# Patient Record
Sex: MALE | Race: WHITE | Employment: FULL TIME | URBAN - METROPOLITAN AREA
[De-identification: names, ages, dates, MRNs, and addresses within clinical notes are randomized per-mention and may not be internally consistent; named-entity substitution may affect disease eponyms.]

---

## 2017-12-20 ENCOUNTER — HOSPITAL ENCOUNTER (OUTPATIENT)
Dept: MRI IMAGING | Age: 52
Discharge: HOME | End: 2017-12-20
Attending: INTERNAL MEDICINE
Payer: OTHER GOVERNMENT

## 2017-12-20 DIAGNOSIS — R51.9 HEADACHE: ICD-10-CM

## 2017-12-20 PROCEDURE — 70551 MRI BRAIN STEM W/O DYE: CPT

## 2018-08-11 ENCOUNTER — OP HISTORICAL/CONVERTED ENCOUNTER (OUTPATIENT)
Dept: OTHER | Age: 53
End: 2018-08-11

## 2018-11-06 ENCOUNTER — HOSPITAL ENCOUNTER (OUTPATIENT)
Dept: LAB | Age: 53
Discharge: HOME | End: 2018-11-06

## 2023-10-11 ENCOUNTER — HOSPITAL ENCOUNTER (OUTPATIENT)
Facility: HOSPITAL | Age: 58
Setting detail: OBSERVATION
Discharge: HOME | End: 2023-10-12
Attending: EMERGENCY MEDICINE | Admitting: INTERNAL MEDICINE
Payer: OTHER GOVERNMENT

## 2023-10-11 ENCOUNTER — APPOINTMENT (OUTPATIENT)
Facility: HOSPITAL | Age: 58
End: 2023-10-11
Payer: OTHER GOVERNMENT

## 2023-10-11 DIAGNOSIS — R06.02 SHORTNESS OF BREATH: ICD-10-CM

## 2023-10-11 DIAGNOSIS — R42 DIZZINESS: ICD-10-CM

## 2023-10-11 DIAGNOSIS — R07.9 CHEST PAIN, UNSPECIFIED TYPE: Primary | ICD-10-CM

## 2023-10-11 LAB
ALBUMIN SERPL-MCNC: 4.1 G/DL (ref 3.5–5)
ALBUMIN/GLOB SERPL: 1.1 (ref 1.1–2.2)
ALP SERPL-CCNC: 52 U/L (ref 45–117)
ALT SERPL-CCNC: 40 U/L (ref 12–78)
ANION GAP SERPL CALC-SCNC: 4 MMOL/L (ref 5–15)
APPEARANCE UR: CLEAR
AST SERPL-CCNC: 21 U/L (ref 15–37)
BACTERIA URNS QL MICRO: NEGATIVE /HPF
BASOPHILS # BLD: 0 K/UL (ref 0–0.1)
BASOPHILS NFR BLD: 1 % (ref 0–1)
BILIRUB SERPL-MCNC: 0.8 MG/DL (ref 0.2–1)
BILIRUB UR QL: NEGATIVE
BUN SERPL-MCNC: 14 MG/DL (ref 6–20)
BUN/CREAT SERPL: 11 (ref 12–20)
CALCIUM SERPL-MCNC: 9.4 MG/DL (ref 8.5–10.1)
CHLORIDE SERPL-SCNC: 108 MMOL/L (ref 97–108)
CO2 SERPL-SCNC: 28 MMOL/L (ref 21–32)
COLOR UR: ABNORMAL
COMMENT:: NORMAL
CREAT SERPL-MCNC: 1.25 MG/DL (ref 0.7–1.3)
D DIMER PPP FEU-MCNC: 0.19 MG/L FEU (ref 0–0.65)
DIFFERENTIAL METHOD BLD: NORMAL
ECHO BSA: 2.14 M2
EKG ATRIAL RATE: 58 BPM
EKG DIAGNOSIS: NORMAL
EKG P AXIS: 68 DEGREES
EKG P-R INTERVAL: 194 MS
EKG Q-T INTERVAL: 440 MS
EKG QRS DURATION: 76 MS
EKG QTC CALCULATION (BAZETT): 431 MS
EKG R AXIS: 78 DEGREES
EKG T AXIS: 90 DEGREES
EKG VENTRICULAR RATE: 58 BPM
EOSINOPHIL # BLD: 0.2 K/UL (ref 0–0.4)
EOSINOPHIL NFR BLD: 3 % (ref 0–7)
EPITH CASTS URNS QL MICRO: ABNORMAL /LPF
ERYTHROCYTE [DISTWIDTH] IN BLOOD BY AUTOMATED COUNT: 12 % (ref 11.5–14.5)
EST. AVERAGE GLUCOSE BLD GHB EST-MCNC: 103 MG/DL
GLOBULIN SER CALC-MCNC: 3.8 G/DL (ref 2–4)
GLUCOSE BLD STRIP.AUTO-MCNC: 109 MG/DL (ref 65–117)
GLUCOSE SERPL-MCNC: 96 MG/DL (ref 65–100)
GLUCOSE UR STRIP.AUTO-MCNC: NEGATIVE MG/DL
HBA1C MFR BLD: 5.2 % (ref 4–5.6)
HCT VFR BLD AUTO: 47.3 % (ref 36.6–50.3)
HGB BLD-MCNC: 16 G/DL (ref 12.1–17)
HGB UR QL STRIP: NEGATIVE
HYALINE CASTS URNS QL MICRO: ABNORMAL /LPF (ref 0–2)
IMM GRANULOCYTES # BLD AUTO: 0 K/UL (ref 0–0.04)
IMM GRANULOCYTES NFR BLD AUTO: 0 % (ref 0–0.5)
KETONES UR QL STRIP.AUTO: NEGATIVE MG/DL
LEUKOCYTE ESTERASE UR QL STRIP.AUTO: NEGATIVE
LYMPHOCYTES # BLD: 1 K/UL (ref 0.8–3.5)
LYMPHOCYTES NFR BLD: 20 % (ref 12–49)
MAGNESIUM SERPL-MCNC: 2.1 MG/DL (ref 1.6–2.4)
MCH RBC QN AUTO: 30.1 PG (ref 26–34)
MCHC RBC AUTO-ENTMCNC: 33.8 G/DL (ref 30–36.5)
MCV RBC AUTO: 88.9 FL (ref 80–99)
MONOCYTES # BLD: 0.5 K/UL (ref 0–1)
MONOCYTES NFR BLD: 9 % (ref 5–13)
NEUTS SEG # BLD: 3.4 K/UL (ref 1.8–8)
NEUTS SEG NFR BLD: 67 % (ref 32–75)
NITRITE UR QL STRIP.AUTO: NEGATIVE
NRBC # BLD: 0 K/UL (ref 0–0.01)
NRBC BLD-RTO: 0 PER 100 WBC
NT PRO BNP: 99 PG/ML
PH UR STRIP: 8 (ref 5–8)
PLATELET # BLD AUTO: 171 K/UL (ref 150–400)
PMV BLD AUTO: 10.9 FL (ref 8.9–12.9)
POTASSIUM SERPL-SCNC: 3.9 MMOL/L (ref 3.5–5.1)
PROT SERPL-MCNC: 7.9 G/DL (ref 6.4–8.2)
PROT UR STRIP-MCNC: NEGATIVE MG/DL
RBC # BLD AUTO: 5.32 M/UL (ref 4.1–5.7)
RBC #/AREA URNS HPF: ABNORMAL /HPF (ref 0–5)
SERVICE CMNT-IMP: NORMAL
SODIUM SERPL-SCNC: 140 MMOL/L (ref 136–145)
SP GR UR REFRACTOMETRY: >1.03 (ref 1–1.03)
SPECIMEN HOLD: NORMAL
SPECIMEN HOLD: NORMAL
STRESS ANGINA INDEX: 0
STRESS BASELINE DIAS BP: 88 MMHG
STRESS BASELINE HR: 57 BPM
STRESS BASELINE ST DEPRESSION: 0 MM
STRESS BASELINE SYS BP: 146 MMHG
STRESS ESTIMATED WORKLOAD: 10 METS
STRESS EXERCISE DUR MIN: 13 MIN
STRESS EXERCISE DUR SEC: 0 SEC
STRESS PEAK DIAS BP: 100 MMHG
STRESS PEAK SYS BP: 170 MMHG
STRESS PERCENT HR ACHIEVED: 94 %
STRESS POST PEAK HR: 153 BPM
STRESS RATE PRESSURE PRODUCT: NORMAL BPM*MMHG
STRESS TARGET HR: 163 BPM
T4 FREE SERPL-MCNC: 1.1 NG/DL (ref 0.8–1.5)
TROPONIN I SERPL HS-MCNC: 4 NG/L (ref 0–76)
TROPONIN I SERPL HS-MCNC: 4 NG/L (ref 0–76)
TSH SERPL DL<=0.05 MIU/L-ACNC: 2.67 UIU/ML (ref 0.36–3.74)
UROBILINOGEN UR QL STRIP.AUTO: 0.2 EU/DL (ref 0.2–1)
WBC # BLD AUTO: 5.1 K/UL (ref 4.1–11.1)
WBC URNS QL MICRO: ABNORMAL /HPF (ref 0–4)

## 2023-10-11 PROCEDURE — 82962 GLUCOSE BLOOD TEST: CPT

## 2023-10-11 PROCEDURE — 93325 DOPPLER ECHO COLOR FLOW MAPG: CPT | Performed by: STUDENT IN AN ORGANIZED HEALTH CARE EDUCATION/TRAINING PROGRAM

## 2023-10-11 PROCEDURE — 99285 EMERGENCY DEPT VISIT HI MDM: CPT

## 2023-10-11 PROCEDURE — 84484 ASSAY OF TROPONIN QUANT: CPT

## 2023-10-11 PROCEDURE — 83735 ASSAY OF MAGNESIUM: CPT

## 2023-10-11 PROCEDURE — 93018 CV STRESS TEST I&R ONLY: CPT | Performed by: STUDENT IN AN ORGANIZED HEALTH CARE EDUCATION/TRAINING PROGRAM

## 2023-10-11 PROCEDURE — 71275 CT ANGIOGRAPHY CHEST: CPT

## 2023-10-11 PROCEDURE — 36415 COLL VENOUS BLD VENIPUNCTURE: CPT

## 2023-10-11 PROCEDURE — 85025 COMPLETE CBC W/AUTO DIFF WBC: CPT

## 2023-10-11 PROCEDURE — 83880 ASSAY OF NATRIURETIC PEPTIDE: CPT

## 2023-10-11 PROCEDURE — 80053 COMPREHEN METABOLIC PANEL: CPT

## 2023-10-11 PROCEDURE — 6360000004 HC RX CONTRAST MEDICATION: Performed by: NURSE PRACTITIONER

## 2023-10-11 PROCEDURE — 93016 CV STRESS TEST SUPVJ ONLY: CPT | Performed by: STUDENT IN AN ORGANIZED HEALTH CARE EDUCATION/TRAINING PROGRAM

## 2023-10-11 PROCEDURE — 84443 ASSAY THYROID STIM HORMONE: CPT

## 2023-10-11 PROCEDURE — G0378 HOSPITAL OBSERVATION PER HR: HCPCS

## 2023-10-11 PROCEDURE — 6360000002 HC RX W HCPCS: Performed by: NURSE PRACTITIONER

## 2023-10-11 PROCEDURE — 83036 HEMOGLOBIN GLYCOSYLATED A1C: CPT

## 2023-10-11 PROCEDURE — 94761 N-INVAS EAR/PLS OXIMETRY MLT: CPT

## 2023-10-11 PROCEDURE — 93320 DOPPLER ECHO COMPLETE: CPT | Performed by: STUDENT IN AN ORGANIZED HEALTH CARE EDUCATION/TRAINING PROGRAM

## 2023-10-11 PROCEDURE — 93350 STRESS TTE ONLY: CPT | Performed by: STUDENT IN AN ORGANIZED HEALTH CARE EDUCATION/TRAINING PROGRAM

## 2023-10-11 PROCEDURE — 81001 URINALYSIS AUTO W/SCOPE: CPT

## 2023-10-11 PROCEDURE — 84439 ASSAY OF FREE THYROXINE: CPT

## 2023-10-11 PROCEDURE — 93005 ELECTROCARDIOGRAM TRACING: CPT | Performed by: EMERGENCY MEDICINE

## 2023-10-11 PROCEDURE — 70450 CT HEAD/BRAIN W/O DYE: CPT

## 2023-10-11 PROCEDURE — 93017 CV STRESS TEST TRACING ONLY: CPT

## 2023-10-11 PROCEDURE — 93010 ELECTROCARDIOGRAM REPORT: CPT | Performed by: INTERNAL MEDICINE

## 2023-10-11 PROCEDURE — 85379 FIBRIN DEGRADATION QUANT: CPT

## 2023-10-11 RX ORDER — ONDANSETRON 2 MG/ML
4 INJECTION INTRAMUSCULAR; INTRAVENOUS EVERY 6 HOURS PRN
Status: DISCONTINUED | OUTPATIENT
Start: 2023-10-11 | End: 2023-10-12 | Stop reason: HOSPADM

## 2023-10-11 RX ORDER — SODIUM CHLORIDE 9 MG/ML
INJECTION, SOLUTION INTRAVENOUS PRN
Status: DISCONTINUED | OUTPATIENT
Start: 2023-10-11 | End: 2023-10-12 | Stop reason: HOSPADM

## 2023-10-11 RX ORDER — ROSUVASTATIN CALCIUM 20 MG/1
TABLET, FILM COATED ORAL
COMMUNITY
Start: 2023-09-26

## 2023-10-11 RX ORDER — CYCLOBENZAPRINE HCL 10 MG
TABLET ORAL
COMMUNITY
Start: 2023-05-12

## 2023-10-11 RX ORDER — ENOXAPARIN SODIUM 100 MG/ML
40 INJECTION SUBCUTANEOUS DAILY
Status: DISCONTINUED | OUTPATIENT
Start: 2023-10-11 | End: 2023-10-12 | Stop reason: HOSPADM

## 2023-10-11 RX ORDER — TRAZODONE HYDROCHLORIDE 100 MG/1
100 TABLET ORAL
COMMUNITY
Start: 2023-05-12 | End: 2023-10-12

## 2023-10-11 RX ORDER — SODIUM CHLORIDE 0.9 % (FLUSH) 0.9 %
5-40 SYRINGE (ML) INJECTION EVERY 12 HOURS SCHEDULED
Status: DISCONTINUED | OUTPATIENT
Start: 2023-10-11 | End: 2023-10-12 | Stop reason: HOSPADM

## 2023-10-11 RX ORDER — LEVOTHYROXINE SODIUM 0.05 MG/1
50 TABLET ORAL
Status: DISCONTINUED | OUTPATIENT
Start: 2023-10-12 | End: 2023-10-12 | Stop reason: HOSPADM

## 2023-10-11 RX ORDER — ONDANSETRON 4 MG/1
4 TABLET, ORALLY DISINTEGRATING ORAL EVERY 8 HOURS PRN
Status: DISCONTINUED | OUTPATIENT
Start: 2023-10-11 | End: 2023-10-12 | Stop reason: HOSPADM

## 2023-10-11 RX ORDER — NITROGLYCERIN 0.4 MG/1
0.4 TABLET SUBLINGUAL EVERY 5 MIN PRN
Status: DISCONTINUED | OUTPATIENT
Start: 2023-10-11 | End: 2023-10-12 | Stop reason: HOSPADM

## 2023-10-11 RX ORDER — ROSUVASTATIN CALCIUM 10 MG/1
20 TABLET, COATED ORAL NIGHTLY
Status: DISCONTINUED | OUTPATIENT
Start: 2023-10-11 | End: 2023-10-12 | Stop reason: HOSPADM

## 2023-10-11 RX ORDER — TRAZODONE HYDROCHLORIDE 50 MG/1
100 TABLET ORAL NIGHTLY PRN
Status: DISCONTINUED | OUTPATIENT
Start: 2023-10-11 | End: 2023-10-12

## 2023-10-11 RX ORDER — ACETAMINOPHEN 650 MG/1
650 SUPPOSITORY RECTAL EVERY 6 HOURS PRN
Status: DISCONTINUED | OUTPATIENT
Start: 2023-10-11 | End: 2023-10-12 | Stop reason: HOSPADM

## 2023-10-11 RX ORDER — MORPHINE SULFATE 2 MG/ML
2 INJECTION, SOLUTION INTRAMUSCULAR; INTRAVENOUS EVERY 4 HOURS PRN
Status: DISCONTINUED | OUTPATIENT
Start: 2023-10-11 | End: 2023-10-12 | Stop reason: HOSPADM

## 2023-10-11 RX ORDER — LEVOTHYROXINE SODIUM 0.05 MG/1
TABLET ORAL
COMMUNITY
Start: 2022-07-20

## 2023-10-11 RX ORDER — ACETAMINOPHEN 325 MG/1
650 TABLET ORAL EVERY 6 HOURS PRN
Status: DISCONTINUED | OUTPATIENT
Start: 2023-10-11 | End: 2023-10-12 | Stop reason: HOSPADM

## 2023-10-11 RX ORDER — POLYETHYLENE GLYCOL 3350 17 G/17G
17 POWDER, FOR SOLUTION ORAL DAILY PRN
Status: DISCONTINUED | OUTPATIENT
Start: 2023-10-11 | End: 2023-10-12 | Stop reason: HOSPADM

## 2023-10-11 RX ORDER — SODIUM CHLORIDE 0.9 % (FLUSH) 0.9 %
5-40 SYRINGE (ML) INJECTION PRN
Status: DISCONTINUED | OUTPATIENT
Start: 2023-10-11 | End: 2023-10-12 | Stop reason: HOSPADM

## 2023-10-11 RX ORDER — ALBUTEROL SULFATE 1.25 MG/3ML
1.25 SOLUTION RESPIRATORY (INHALATION) EVERY 6 HOURS PRN
Status: DISCONTINUED | OUTPATIENT
Start: 2023-10-11 | End: 2023-10-12 | Stop reason: HOSPADM

## 2023-10-11 RX ADMIN — IOPAMIDOL 100 ML: 755 INJECTION, SOLUTION INTRAVENOUS at 10:05

## 2023-10-11 RX ADMIN — ALBUTEROL SULFATE 1.25 MG: 1.25 SOLUTION RESPIRATORY (INHALATION) at 22:02

## 2023-10-11 ASSESSMENT — ENCOUNTER SYMPTOMS
EYE REDNESS: 0
ABDOMINAL DISTENTION: 0
SINUS PRESSURE: 0
VOMITING: 0
EYE PAIN: 0
NAUSEA: 0
SINUS PAIN: 0
SHORTNESS OF BREATH: 1
ABDOMINAL PAIN: 0
COLOR CHANGE: 0
TROUBLE SWALLOWING: 0
COUGH: 0

## 2023-10-11 ASSESSMENT — PAIN DESCRIPTION - ONSET: ONSET: AWAKENED FROM SLEEP

## 2023-10-11 ASSESSMENT — PAIN SCALES - GENERAL
PAINLEVEL_OUTOF10: 7
PAINLEVEL_OUTOF10: 0

## 2023-10-11 ASSESSMENT — PAIN - FUNCTIONAL ASSESSMENT
PAIN_FUNCTIONAL_ASSESSMENT: 0-10
PAIN_FUNCTIONAL_ASSESSMENT: NONE - DENIES PAIN

## 2023-10-11 ASSESSMENT — PAIN DESCRIPTION - LOCATION: LOCATION: CHEST

## 2023-10-11 ASSESSMENT — HEART SCORE: ECG: 1

## 2023-10-11 ASSESSMENT — PAIN DESCRIPTION - DESCRIPTORS: DESCRIPTORS: HEAVINESS

## 2023-10-11 NOTE — ED NOTES
Pt in [address] [place] [address] on calling for triage     Susan Thurman RN  10/11/23 2556

## 2023-10-11 NOTE — H&P
History and Physical    Date of Service:  10/11/2023  Primary Care Provider: Jacqueline Boles MD  Source of information: Patient, Family, External Medical Records    Chief Complaint: Shortness of Breath and Chest Pressure      History of Presenting Illness:   Missael Espino is a 62 y.o. male with a past medical history of hypothyroidism, HLD, who presents to the emergency room due to chest pain. Patient states he has been having chest pain for approximately 3 days. It is worse with laying flat, and when he takes a deep breath he feels like he is \"not getting oxygen. \"  He states his symptoms have worsened progressively over 3 days. He has had no cough, fevers, chills, congestion, sore throat, palpitations, or other complaints. In the ER his troponins were negative, EKG was sinus rhythm, and cardiology was consulted. Internal medicine was consulted for admission. The patient denies any headache, blurry vision, sore throat, trouble swallowing, trouble with speech, cough, fever, chills, N/V/D, abd pain, urinary symptoms, constipation, recent travels, sick contacts, focal or generalized neurological symptoms, falls, injuries, rashes, contact with COVID-19 diagnosed patients, hematemesis, melena, hemoptysis, hematuria, rashes, denies starting any new medications and denies any other concerns or problems besides as mentioned above. REVIEW OF SYSTEMS:  A comprehensive review of systems was negative except for that written in the History of Present Illness. No past medical history on file. Past Surgical History:   Procedure Laterality Date    XR MIDLINE EQUAL OR GREATER THAN 5 YEARS  1/21/2019    XR MIDLINE EQUAL OR GREATER THAN 5 YEARS 1/21/2019     Prior to Admission medications    Medication Sig Start Date End Date Taking?  Authorizing Provider   levothyroxine (SYNTHROID) 50 MCG tablet  7/20/22  Yes Provider, MD Guicho   cyclobenzaprine (FLEXERIL) 10 MG tablet Cyclobenzaprine Oral Emergency Contact: [facility] [address] Phone: [phone]  Mobile Phone: [phone]  Relation: Spouse     CRITICAL CARE WAS PERFORMED FOR THIS ENCOUNTER: 0 minutes performed in critical care, excluding procedures      Signed By: Opal Bello MD     October 11, 2023         Please note that this dictation may have been completed with Christen Medina, the computer voice recognition software. Quite often unanticipated grammatical, syntax, homophones, and other interpretive errors are inadvertently transcribed by the computer software. Please disregard these errors. Please excuse any errors that have escaped final proofreading.

## 2023-10-11 NOTE — ED TRIAGE NOTES
Pt arrives ambulatory via POV w/ c/c of SOB & chest pressure beginning 3 days ago. Pt reports recent hx of dizziness/vertigo 6 weeks ago for which he had an echocardiogram done @ the [facility] which was normal.     Pt denies any PMH other than XOL & hypothyroidism.

## 2023-10-11 NOTE — ED PROVIDER NOTES
[facility] EMERGENCY DEPT  EMERGENCY DEPARTMENT ENCOUNTER      Pt Name: Paula Rodas  MRN: [de-identified]  [id number] [facility] [place] R 1965  Date of evaluation: 10/11/2023  Provider: JODI Vickers NP      HISTORY OF PRESENT ILLNESS      This is a 59-year-old male who presents ambulatory to the emergency room with complaints of 3 days worth of chest pain described as pressure. Patient states approximately 6 weeks ago he developed dizziness and what he thought to be vertigo. At that time he had an echocardiogram done at the [place] which was normal.  Comes to the emergency room today with complaints of chest pressure and the feeling as though \"he cannot breathe. \"  Patient denies any worsening dyspnea on exertion, nausea or vomiting, fevers or chills. No diaphoresis. Does have a history of hypercholesterol and hypothyroidism. No early heart disease noted. No prior cardiac disease. No further complaints at this time. The history is provided by the patient. Nursing Notes were reviewed. REVIEW OF SYSTEMS         Review of Systems   Constitutional:  Negative for activity change, chills, diaphoresis, fatigue and fever. HENT:  Negative for congestion, sinus pressure, sinus pain and trouble swallowing. Eyes:  Negative for pain, redness and visual disturbance. Respiratory:  Positive for shortness of breath. Negative for cough. Cardiovascular:  Positive for chest pain. Negative for palpitations. Gastrointestinal:  Negative for abdominal distention, abdominal pain, nausea and vomiting. Genitourinary:  Negative for difficulty urinating, frequency and urgency. Musculoskeletal:  Negative for arthralgias, gait problem, neck pain and neck stiffness. Skin:  Negative for color change, pallor, rash and wound. Neurological:  Positive for dizziness. Negative for speech difficulty and weakness. Hematological:  Does not bruise/bleed easily. Psychiatric/Behavioral:  Negative for agitation.  The patient

## 2023-10-12 VITALS
HEIGHT: 73 IN | BODY MASS INDEX: 25.98 KG/M2 | DIASTOLIC BLOOD PRESSURE: 91 MMHG | SYSTOLIC BLOOD PRESSURE: 120 MMHG | HEART RATE: 71 BPM | OXYGEN SATURATION: 97 % | WEIGHT: 196 LBS | RESPIRATION RATE: 14 BRPM | TEMPERATURE: 97.7 F

## 2023-10-12 LAB
AMPHET UR QL SCN: NEGATIVE
ANION GAP SERPL CALC-SCNC: 5 MMOL/L (ref 5–15)
BARBITURATES UR QL SCN: NEGATIVE
BASOPHILS # BLD: 0 K/UL (ref 0–0.1)
BASOPHILS NFR BLD: 1 % (ref 0–1)
BENZODIAZ UR QL: NEGATIVE
BUN SERPL-MCNC: 12 MG/DL (ref 6–20)
BUN/CREAT SERPL: 10 (ref 12–20)
CALCIUM SERPL-MCNC: 8.4 MG/DL (ref 8.5–10.1)
CANNABINOIDS UR QL SCN: NEGATIVE
CHLORIDE SERPL-SCNC: 109 MMOL/L (ref 97–108)
CHOLEST SERPL-MCNC: 175 MG/DL
CO2 SERPL-SCNC: 27 MMOL/L (ref 21–32)
COCAINE UR QL SCN: NEGATIVE
CREAT SERPL-MCNC: 1.15 MG/DL (ref 0.7–1.3)
DIFFERENTIAL METHOD BLD: ABNORMAL
EOSINOPHIL # BLD: 0.1 K/UL (ref 0–0.4)
EOSINOPHIL NFR BLD: 2 % (ref 0–7)
ERYTHROCYTE [DISTWIDTH] IN BLOOD BY AUTOMATED COUNT: 12 % (ref 11.5–14.5)
GLUCOSE SERPL-MCNC: 111 MG/DL (ref 65–100)
HCT VFR BLD AUTO: 42.7 % (ref 36.6–50.3)
HDLC SERPL-MCNC: 53 MG/DL
HDLC SERPL: 3.3 (ref 0–5)
HGB BLD-MCNC: 14.9 G/DL (ref 12.1–17)
IMM GRANULOCYTES # BLD AUTO: 0 K/UL (ref 0–0.04)
IMM GRANULOCYTES NFR BLD AUTO: 0 % (ref 0–0.5)
LDLC SERPL CALC-MCNC: 103 MG/DL (ref 0–100)
LYMPHOCYTES # BLD: 1 K/UL (ref 0.8–3.5)
LYMPHOCYTES NFR BLD: 14 % (ref 12–49)
Lab: NORMAL
MCH RBC QN AUTO: 31 PG (ref 26–34)
MCHC RBC AUTO-ENTMCNC: 34.9 G/DL (ref 30–36.5)
MCV RBC AUTO: 89 FL (ref 80–99)
METHADONE UR QL: NEGATIVE
MONOCYTES # BLD: 0.6 K/UL (ref 0–1)
MONOCYTES NFR BLD: 8 % (ref 5–13)
NEUTS SEG # BLD: 5.2 K/UL (ref 1.8–8)
NEUTS SEG NFR BLD: 75 % (ref 32–75)
NRBC # BLD: 0 K/UL (ref 0–0.01)
NRBC BLD-RTO: 0 PER 100 WBC
OPIATES UR QL: NEGATIVE
PCP UR QL: NEGATIVE
PHOSPHATE SERPL-MCNC: 3.9 MG/DL (ref 2.6–4.7)
PLATELET # BLD AUTO: 149 K/UL (ref 150–400)
PMV BLD AUTO: 11 FL (ref 8.9–12.9)
POTASSIUM SERPL-SCNC: 3.6 MMOL/L (ref 3.5–5.1)
RBC # BLD AUTO: 4.8 M/UL (ref 4.1–5.7)
SODIUM SERPL-SCNC: 141 MMOL/L (ref 136–145)
TRIGL SERPL-MCNC: 95 MG/DL
VLDLC SERPL CALC-MCNC: 19 MG/DL
WBC # BLD AUTO: 7 K/UL (ref 4.1–11.1)

## 2023-10-12 PROCEDURE — 85025 COMPLETE CBC W/AUTO DIFF WBC: CPT

## 2023-10-12 PROCEDURE — 80048 BASIC METABOLIC PNL TOTAL CA: CPT

## 2023-10-12 PROCEDURE — 84100 ASSAY OF PHOSPHORUS: CPT

## 2023-10-12 PROCEDURE — 36415 COLL VENOUS BLD VENIPUNCTURE: CPT

## 2023-10-12 PROCEDURE — G0378 HOSPITAL OBSERVATION PER HR: HCPCS

## 2023-10-12 PROCEDURE — 80307 DRUG TEST PRSMV CHEM ANLYZR: CPT

## 2023-10-12 PROCEDURE — 94761 N-INVAS EAR/PLS OXIMETRY MLT: CPT

## 2023-10-12 PROCEDURE — 80061 LIPID PANEL: CPT

## 2023-10-12 RX ORDER — IBUPROFEN 600 MG/1
600 TABLET ORAL EVERY 8 HOURS
Qty: 20 TABLET | Refills: 0
Start: 2023-10-12

## 2023-10-12 NOTE — ED NOTES
Patient is frustrated being NPO still and has not received results from stress echo that was completed early afternoon yesterday. Patient still c/o chest pressure rated 6/10 and orthopnea.  [employer] message sent to cards office and att Dr. Gian Colbert, [place], [place]  10/12/23 7413

## 2023-10-12 NOTE — DISCHARGE SUMMARY
Hospitalist Discharge Summary     Patient ID:    Jacob Barajas  [de-identified]  62 y.o.  1965    Admit date of service: 10/11/2023    Discharge date of service: 10/12/2023    Admission Diagnoses: Chest pain [R07.9]    Chronic Diagnoses:      Discharge Medications:   Current Discharge Medication List        START taking these medications    Details   ibuprofen (ADVIL;MOTRIN) 600 MG tablet Take 1 tablet by mouth every 8 (eight) hours  Qty: 20 tablet, Refills: 0           CONTINUE these medications which have NOT CHANGED    Details   levothyroxine (SYNTHROID) 50 MCG tablet       cyclobenzaprine (FLEXERIL) 10 MG tablet Cyclobenzaprine Oral  po 1.0  prn    active      CRESTOR 20 MG tablet            STOP taking these medications       traZODone (DESYREL) 100 MG tablet Comments:   Reason for Stopping: Follow up Care:    1. No follow-up provider specified. in 1-2 weeks  2. Diet:  regular diet    Disposition:  Home. Advanced Directive:    Discharge Exam:  See today's note. CONSULTATIONS: cardiology    Significant Diagnostic Studies:   Recent Labs     10/11/23  0908 10/12/23  0311   WBC 5.1 7.0   HGB 16.0 14.9   HCT 47.3 42.7    149*     Recent Labs     10/11/23  0908 10/12/23  0311    141   K 3.9 3.6    109*   CO2 28 27   BUN 14 12   MG 2.1  --    PHOS  --  3.9     Recent Labs     10/11/23  0908   ALT 40   GLOB 3.8     No results for input(s): \"INR\", \"APTT\" in the last 72 hours. Invalid input(s): \"PTP\"   No results for input(s): \"TIBC\", \"FERR\" in the last 72 hours. Invalid input(s): \"FE\", \"PSAT\"   No results for input(s): \"PH\", \"PCO2\", \"PO2\" in the last 72 hours. No results for input(s): \"CPK\", \"CKMB\", \"TROPONINI\" in the last 72 hours. No results found for: \"GLUCPOC\"          HOSPITAL COURSE & TREATMENT RENDERED:   Chest pain. Likely atypical/ pleurisy. Troponins are negative, EKG without ischemic changes. Stress echo is negative. Evaluated by cardiology.  Start taking ibuprofen. 2.  Hypothyroidism. continue levothyroxine. Normal TSH     3. Hyperlipidemia. On statin. Discharged in stable condition.     Spent 35 minutes    Signed:  Jaspal Mast MD  10/12/2023  10:01 AM

## 2023-10-12 NOTE — PROGRESS NOTES
[address] [place] [address]  [phone]      Hospitalist Progress Note      NAME: Declan Miller   :  1965  MRM:  [id number]    Date of service: 10/12/2023  9:52 AM       Assessment and Plan:   Chest pain. Likely atypical/ pleurisy. Troponins are negative, EKG without ischemic changes. Stress echo is negative. Evaluated by cardiology. Start taking ibuprofen. 2.  Hypothyroidism. continue levothyroxine. Normal TSH     3. Hyperlipidemia. On statin. Subjective:     Chief Complaint[de-identified] Patient was seen and examined as a follow up for chest pain. Chart was reviewed. c/o pleuritic chest pain     ROS:  (bold if positive, if negative)    Tolerating PT  Tolerating Diet        Objective:     Last 24hrs VS reviewed since prior progress note.  Most recent are:    Vitals:    10/12/23 0800   BP: (!) 120/91   Pulse: 71   Resp: 14   Temp:    SpO2: 97%     SpO2 Readings from Last 6 Encounters:   10/12/23 97%        No intake or output data in the 24 hours ending 10/12/23 9683     Physical Exam:    Gen:  Well-developed, well-nourished, in no acute distress  HEENT:  Pink conjunctivae, PERRL, hearing intact to voice, moist mucous membranes  Neck:  Supple, without masses, thyroid non-tender  Resp:  No accessory muscle use, clear breath sounds without wheezes rales or rhonchi  Card:  No murmurs, normal S1, S2 without thrills, bruits or peripheral edema  Abd:  Soft, non-tender, non-distended, normoactive bowel sounds are present, no palpable organomegaly and no detectable hernias  Lymph:  No cervical or inguinal adenopathy  Musc:  No cyanosis or clubbing  Skin:  No rashes or ulcers, skin turgor is good  Neuro:  Cranial nerves are grossly intact, no focal motor weakness, follows commands appropriately  Psych:  Good insight, oriented to person, place and time, alert  __________________________________________________________________  Medications Reviewed: (see below)  Medications:     Current Facility-Administered Medications   Medication Dose Route Frequency    sodium chloride flush 0.9 % injection 5-40 mL  5-40 mL IntraVENous 2 times per day    sodium chloride flush 0.9 % injection 5-40 mL  5-40 mL IntraVENous PRN    0.9 % sodium chloride infusion   IntraVENous PRN    enoxaparin (LOVENOX) injection 40 mg  40 mg SubCUTAneous Daily    ondansetron (ZOFRAN-ODT) disintegrating tablet 4 mg  4 mg Oral Q8H PRN    Or    ondansetron (ZOFRAN) injection 4 mg  4 mg IntraVENous Q6H PRN    polyethylene glycol (GLYCOLAX) packet 17 g  17 g Oral Daily PRN    acetaminophen (TYLENOL) tablet 650 mg  650 mg Oral Q6H PRN    Or    acetaminophen (TYLENOL) suppository 650 mg  650 mg Rectal Q6H PRN    nitroGLYCERIN (NITROSTAT) SL tablet 0.4 mg  0.4 mg SubLINGual Q5 Min PRN    morphine (PF) injection 2 mg  2 mg IntraVENous Q4H PRN    rosuvastatin (CRESTOR) tablet 20 mg  20 mg Oral Nightly    levothyroxine (SYNTHROID) tablet 50 mcg  50 mcg Oral QAM AC    albuterol (ACCUNEB) nebulizer solution 1.25 mg  1.25 mg Nebulization Q6H PRN     Current Outpatient Medications   Medication Sig    levothyroxine (SYNTHROID) 50 MCG tablet     cyclobenzaprine (FLEXERIL) 10 MG tablet Cyclobenzaprine Oral  po 1.0  prn    active    CRESTOR 20 MG tablet         Lab Data Reviewed: (see below)  Lab Review:     Recent Labs     10/11/23  0908 10/12/23  0311   WBC 5.1 7.0   HGB 16.0 14.9   HCT 47.3 42.7    149*     Recent Labs     10/11/23  0908 10/12/23  0311    141   K 3.9 3.6    109*   CO2 28 27   BUN 14 12   MG 2.1  --    PHOS  --  3.9   ALT 40  --      No results found for: \"GLUCPOC\"  No results for input(s): \"PH\", \"PCO2\", \"PO2\", \"HCO3\", \"FIO2\" in the last 72 hours. No results for input(s): \"INR\" in the last 72 hours. [unfilled]    I have reviewed notes of prior 24hr. Other pertinent lab: Total time: -35- minutes.  I personally saw and examined the patient during this time

## 2023-10-12 NOTE — DISCHARGE INSTRUCTIONS
ACUTE DIAGNOSES:  Chest pain [R07.9]    CHRONIC MEDICAL DIAGNOSES:  [unfilled]    DISCHARGE MEDICATIONS:   [unfilled]    It is important that you take the medication exactly as they are prescribed. Keep your medication in the bottles provided by the pharmacist and keep a list of the medication names, dosages, and times to be taken in your wallet. Do not take other medications without consulting your doctor. DIET:  regular diet    ACTIVITY: activity as tolerated    ADDITIONAL INFORMATION: If you experience any of the following symptoms then please call your primary care physician or return to the emergency room if you cannot get hold of your doctor: Fever, chills, nausea, vomiting, diarrhea, change in mentation, falling, bleeding, shortness of breath. FOLLOW UP CARE:   [email or web address]@  you are to call and set up an appointment to see them in 5 days. Follow-up  No follow-up provider specified. Information obtained by :  I understand that if any problems occur once I am at home I am to contact my physician. I understand and acknowledge receipt of the instructions indicated above.                                                                                                                                            Physician's or R.N.'s Signature                                                                  Date/Time                                                                                                                                              Patient or Representative Signature                                                          Date/Time